# Patient Record
Sex: FEMALE
[De-identification: names, ages, dates, MRNs, and addresses within clinical notes are randomized per-mention and may not be internally consistent; named-entity substitution may affect disease eponyms.]

---
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
## 2022-11-05 ENCOUNTER — NON-APPOINTMENT (OUTPATIENT)
Age: 22
End: 2022-11-05

## 2022-11-18 ENCOUNTER — NON-APPOINTMENT (OUTPATIENT)
Age: 22
End: 2022-11-18

## 2022-12-05 ENCOUNTER — NON-APPOINTMENT (OUTPATIENT)
Age: 22
End: 2022-12-05

## 2023-02-26 ENCOUNTER — NON-APPOINTMENT (OUTPATIENT)
Age: 23
End: 2023-02-26

## 2023-11-26 ENCOUNTER — NON-APPOINTMENT (OUTPATIENT)
Age: 23
End: 2023-11-26

## 2023-12-28 ENCOUNTER — NON-APPOINTMENT (OUTPATIENT)
Age: 23
End: 2023-12-28

## 2024-04-16 ENCOUNTER — NON-APPOINTMENT (OUTPATIENT)
Age: 24
End: 2024-04-16

## 2024-04-18 ENCOUNTER — APPOINTMENT (OUTPATIENT)
Dept: ORTHOPEDIC SURGERY | Facility: CLINIC | Age: 24
End: 2024-04-18
Payer: COMMERCIAL

## 2024-04-18 VITALS
SYSTOLIC BLOOD PRESSURE: 108 MMHG | HEIGHT: 66 IN | DIASTOLIC BLOOD PRESSURE: 68 MMHG | HEART RATE: 56 BPM | WEIGHT: 190 LBS | BODY MASS INDEX: 30.53 KG/M2

## 2024-04-18 DIAGNOSIS — Z86.39 PERSONAL HISTORY OF OTHER ENDOCRINE, NUTRITIONAL AND METABOLIC DISEASE: ICD-10-CM

## 2024-04-18 DIAGNOSIS — E11.628 TYPE 2 DIABETES MELLITUS WITH OTHER SKIN COMPLICATIONS: ICD-10-CM

## 2024-04-18 DIAGNOSIS — Z83.49 FAMILY HISTORY OF OTHER ENDOCRINE, NUTRITIONAL AND METABOLIC DISEASES: ICD-10-CM

## 2024-04-18 PROCEDURE — 72110 X-RAY EXAM L-2 SPINE 4/>VWS: CPT

## 2024-04-18 PROCEDURE — 99204 OFFICE O/P NEW MOD 45 MIN: CPT

## 2024-04-18 RX ORDER — LEVOTHYROXINE SODIUM 0.05 MG/1
50 TABLET ORAL
Refills: 0 | Status: ACTIVE | COMMUNITY

## 2024-04-18 RX ORDER — METFORMIN HYDROCHLORIDE 500 MG/1
500 TABLET, COATED ORAL
Refills: 0 | Status: ACTIVE | COMMUNITY

## 2024-04-18 RX ORDER — CABERGOLINE 0.5 MG/1
TABLET ORAL
Refills: 0 | Status: ACTIVE | COMMUNITY

## 2024-04-18 NOTE — HISTORY OF PRESENT ILLNESS
[de-identified] : Chief Complaint: Low back pain   History of Present Illness: 23-year-old female presenting today for initial evaluation for low back pain.  She states that she is never had back pain before however over the past 2 weeks she has noted an aching sensation in the left side of her low back and then about 4 days ago she states that the pain is worsened and is now more severe.  She is noticing some left-sided lower lumbar spine pinpoint to the PSIS and just medial to it.  She states that pain is a 5 out of 10 in severity but can increase to a 7 out of 10 with certain exercises and activities.  She states the pain is exacerbated with getting in and out of her car with sitting for long periods or with bending over and lifting.  She denies any radicular symptoms down her legs.  She denies any numbness or tingling.  She has tried Tylenol and ibuprofen with no significant relief.   Past medical history, past surgical history, medications, allergies, social history, and family history are as documented in our records today.  Notable items include: None   Review of Systems: I have reviewed the patient's documented Review of Systems data today, I concur with this documentation.

## 2024-04-18 NOTE — ASSESSMENT
[FreeTextEntry1] : 23-year-old female with lumbar strain  Today I discussed with Buddy that I believe she is having from a lumbar strain and that this should improve with conservative management including medications therapy and time.  The patient and I discussed the use of non-steroidal anti-inflammatory drugs (NSAIDs) today.  The patient understands that there are both over the counter (OTC) and prescribed medications in this drug class that may be used in the treatment of spinal conditions.  The benefits of this class of medications may include: diminished pain, increased function, and a diminished use of other classes of medications.  Use of medications in this class may also have risks.  These risks include, but are not limited to: kidney damage, gastro-intestinal/stomach issues, cardiovascular issues, reactions to the medication (allergy/intolerance), bleeding concerns, interactions with other medication, and other issues.  Today, I have prescribed Celebrex 200 mg twice daily. The patient was counseled to contact us if concerns arise as the result of this medication.  The patient was also counseled to stop the medication if these concerns arise.

## 2024-04-18 NOTE — REVIEW OF SYSTEMS
[Joint Pain] : joint pain [Joint Stiffness] : joint stiffness [Joint Swelling] : joint swelling [Negative] : Heme/Lymph [FreeTextEntry9] : Back

## 2024-04-18 NOTE — DISCUSSION/SUMMARY
[de-identified] : Home exercises and core strengthening a packet was provided today in the office to perform his exercises up to 30 minutes to 1 hour a day for 3 days a week Celebrex 200 mg twice daily for 10 days and then as needed thereafter All modalities such as stretching, heat, ice and activity modifications I will see her back in 4 weeks

## 2024-04-18 NOTE — PHYSICAL EXAM
[de-identified] : CONSTITUTIONAL: Patient is a very pleasant individual who is well-nourished and appears stated age. PSYCHIATRIC: Alert and oriented times three and in no apparent distress, and participates with orthopedic evaluation well. HEAD: Atraumatic and nonsyndromic in appearance. EENT: No thyromegaly, EOMI. RESPIRATORY: Respiratory rate is regular, not dyspneic on examination. LYMPHATICS: There is no cervical or axillary lymphadenopathy. INTEGUMENTARY: Skin is clean, dry, and intact to bilateral lower extremities. VASCULAR: There is brisk capillary refill about the bilateral Lower Extremities with 2+ DP Pulse  Palpation: There is tenderness along the left side lower lumbar spine around the PSIS medial paraspinal musculature No pain with internal/external range of motion of bilateral hips Muscle Strength Testing: Hip Flexion: 5/5 B/L Knee Extension: 5/5 B/L Knee Flexion: 5/5 B/L Dorsiflexion: 5/5 B/L EHL: 5/5 B/L Plantarflexion: 5/5 B/L  Sensation: SILT L2-S1 B/L except: None  Reflexes: 2+ Quadriceps/Achilles  Gait: Normal gait w/o assistance Able to perform tandem gait Able to Heel Walk Able to Toe Walk  Special Testing:  Positive straight leg raise test to left lower extremity recreating low back pain but no radicular symptoms Positive contralateral straight leg raise test right lower extremity recreating left-sided low back no radicular symptoms Negative clonus BLLE  [de-identified] : Standing AP, lateral, flexion and extension radiographs of the lumbar spine performed on 4/18/2024 in the Radiology Department at [facility] [place] for the indication of low back pain are reviewed.  These studies demonstrate there is well-maintained lumbar lordosis there is well-maintained disc height no signs of spondylosis or spondylolisthesis.

## 2024-05-16 ENCOUNTER — APPOINTMENT (OUTPATIENT)
Dept: ORTHOPEDIC SURGERY | Facility: CLINIC | Age: 24
End: 2024-05-16
Payer: COMMERCIAL

## 2024-05-16 VITALS
HEART RATE: 63 BPM | TEMPERATURE: 98.1 F | BODY MASS INDEX: 31.5 KG/M2 | SYSTOLIC BLOOD PRESSURE: 129 MMHG | HEIGHT: 66 IN | WEIGHT: 196 LBS | DIASTOLIC BLOOD PRESSURE: 74 MMHG

## 2024-05-16 DIAGNOSIS — S39.012A STRAIN OF MUSCLE, FASCIA AND TENDON OF LOWER BACK, INITIAL ENCOUNTER: ICD-10-CM

## 2024-05-16 PROCEDURE — 99213 OFFICE O/P EST LOW 20 MIN: CPT

## 2024-05-16 RX ORDER — METHOCARBAMOL 750 MG/1
750 TABLET, FILM COATED ORAL
Qty: 30 | Refills: 0 | Status: ACTIVE | COMMUNITY
Start: 2024-05-16 | End: 1900-01-01

## 2024-05-16 NOTE — DISCUSSION/SUMMARY
[de-identified] : Physical therapy 2-3 times per week for 6 to 8 weeks [facility] service referral was placed Methocarbamol 750 mg at nighttime She will continue Celebrex as needed I will see her back in 4 to 6 weeks if her symptoms do not improve we will move forward with a lumbar MRI

## 2024-05-16 NOTE — HISTORY OF PRESENT ILLNESS
[de-identified] : Chief Complaint: Low back pain   History of Present Illness: 23-year-old female presenting today for a 1 month follow-up visit.  4 weeks ago Buddy was complaining of low back pain mainly in the lower lumbar spine left-sided radiating into the left side of the sacrum aching in nature we have tried a course of anti-inflammatories with Celebrex and home exercises she states that her symptoms have mildly improved the medications help the therapy has mildly helped but she still having discomfort mainly with sitting and driving she would like to consider other options and interventions for pain management.  She states that her pain has not changed in location   Past medical history, past surgical history, medications, allergies, social history, and family history are as documented in our records today.  Notable items include: None   Review of Systems: I have reviewed the patient's documented Review of Systems data today, I concur with this documentation.

## 2024-05-17 ENCOUNTER — RX RENEWAL (OUTPATIENT)
Age: 24
End: 2024-05-17

## 2024-06-13 ENCOUNTER — APPOINTMENT (OUTPATIENT)
Dept: ORTHOPEDIC SURGERY | Facility: CLINIC | Age: 24
End: 2024-06-13

## 2025-01-15 ENCOUNTER — NON-APPOINTMENT (OUTPATIENT)
Age: 25
End: 2025-01-15

## 2025-02-03 ENCOUNTER — APPOINTMENT (OUTPATIENT)
Dept: ORTHOPEDIC SURGERY | Facility: CLINIC | Age: 25
End: 2025-02-03

## 2025-02-06 ENCOUNTER — TRANSCRIPTION ENCOUNTER (OUTPATIENT)
Age: 25
End: 2025-02-06

## 2025-02-06 ENCOUNTER — APPOINTMENT (OUTPATIENT)
Dept: ORTHOPEDIC SURGERY | Facility: CLINIC | Age: 25
End: 2025-02-06
Payer: OTHER MISCELLANEOUS

## 2025-02-06 VITALS
HEART RATE: 66 BPM | HEIGHT: 66 IN | BODY MASS INDEX: 31.98 KG/M2 | WEIGHT: 199 LBS | SYSTOLIC BLOOD PRESSURE: 114 MMHG | DIASTOLIC BLOOD PRESSURE: 68 MMHG

## 2025-02-06 DIAGNOSIS — S39.012A STRAIN OF MUSCLE, FASCIA AND TENDON OF LOWER BACK, INITIAL ENCOUNTER: ICD-10-CM

## 2025-02-06 PROCEDURE — 99213 OFFICE O/P EST LOW 20 MIN: CPT

## 2025-02-06 RX ORDER — CELECOXIB 200 MG/1
200 CAPSULE ORAL TWICE DAILY
Qty: 60 | Refills: 3 | Status: ACTIVE | COMMUNITY
Start: 2025-02-06 | End: 1900-01-01

## 2025-02-06 RX ORDER — METHOCARBAMOL 750 MG/1
750 TABLET, FILM COATED ORAL 3 TIMES DAILY
Qty: 42 | Refills: 3 | Status: ACTIVE | COMMUNITY
Start: 2025-02-06 | End: 1900-01-01

## 2025-03-07 ENCOUNTER — APPOINTMENT (OUTPATIENT)
Dept: ORTHOPEDIC SURGERY | Facility: CLINIC | Age: 25
End: 2025-03-07
Payer: OTHER MISCELLANEOUS

## 2025-03-07 VITALS
BODY MASS INDEX: 31.98 KG/M2 | HEART RATE: 64 BPM | DIASTOLIC BLOOD PRESSURE: 68 MMHG | SYSTOLIC BLOOD PRESSURE: 106 MMHG | WEIGHT: 199 LBS | HEIGHT: 66 IN

## 2025-03-07 DIAGNOSIS — S39.012A STRAIN OF MUSCLE, FASCIA AND TENDON OF LOWER BACK, INITIAL ENCOUNTER: ICD-10-CM

## 2025-03-07 PROCEDURE — 99213 OFFICE O/P EST LOW 20 MIN: CPT

## 2025-08-03 ENCOUNTER — NON-APPOINTMENT (OUTPATIENT)
Age: 25
End: 2025-08-03